# Patient Record
(demographics unavailable — no encounter records)

---

## 2021-02-26 NOTE — NUR
DC INSTRUCTIONS GIVEN TO PT. STATES UNDERSTANDING. FLUSHED PORT PER
PROTOCOL BEFORE REMOVING. DC'D IV CATH FULLY INTACT. WILL DC PT
SHORTLY

## 2021-02-26 NOTE — OP
PATIENT NAME:  DORIAN SONI                       MEDICAL RECORD: [id number]
:85                                             LOCATION:D.OPS          
                                                         ADMISSION DATE:        
SURGEON:  DAYNA AHMADI MD              
 
 
DATE OF OPERATION:  2021
 
PROCEDURE:  Upper endoscopy.
 
PREOPERATIVE DIAGNOSIS:  Anemia.
 
MEDICATIONS:  Propofol per anesthesia.
 
Upper endoscopy was performed.  The endoscope was advanced through the mouth and
advanced to the second part of the duodenum.  The entire examined esophagus was
normal.  In the gastric body was mild erythema consistent with gastritis. 
Random gastric biopsies were taken.  The entire examined duodenum was normal. 
Small bowel biopsies were taken.  The patient tolerated the procedure well. 
There were no immediate complications.
 
FINAL DIAGNOSIS:  Normal esophagus, mild gastritis, normal duodenum.
 
PLAN:  Check histology results, advance diet, await biopsies and decide on any
further medications.  Recommend colonoscopy for anemia workup.
 
TRANSINT:[id number] Voice Confirmation ID: [id number] DOCUMENT ID: [id number]
                                           
                                           DAYNA AHMADI MD              
 
 
 
 
 
 
 
 
 
 
 
 
 
 
 
 
 
 
 
 
 
CC:                                                             [id number]
DICTATION DATE: 21 1544     :     21 0108      [facility] 
                                                                      [id number] [place] [address]

## 2021-04-19 NOTE — NUR
ROUNDS MADE. PT LYING IN BED W/EYES CLOSED. RESP EVEN AND UNLABORED. CURRENT
BOLUS NEAR COMPLETE. IV RATE CHANGED TO 125ML/HR. ICE WATER SERVED. PT DENIES
FURTHER NEEDS. BED LOW, SIDE RAILS UP X 2. CALL LIGHT AT PT'S SIDE.

## 2021-04-19 NOTE — NUR
ROUNDS MADE. PT INFORMED THAT HER TEST RESULTS CAME BACK NEGATIVE. REPORT HAS
BEEN CALLED TO DR ANDERSEN. NO ADDITIONAL ORDERS AT THIS TIME. POC WILL
CONTINUE. WHILE AT BEDSIDE, PT REQUEST TO GET UP TO VOID. PT OOB W/MINIMAL
ASSISTANCE. AMBIATORY TO BR. VOIDS APPROX 200ML IN NUNS CAP. FRESH PERIPADS
PROVIDED. PT BACK TO BED. TYLENOL 650MG PO GIVEN FOR TEMP .5. PT NOW
REQUESTING PAIN MEDICATION. MORPHINE 4MG DILUTED IN 5ML NS GIVEN SIVP. SEE
EMAR. PT DENIES FURTHER NEEDS AT THIS TIME. BED LOW. SIDE RAILS UP X 2. CALL
LIGHT AT PT'S SIDE.

## 2021-04-19 NOTE — NUR
PT RETURNS TO  VIA W/C PER MEDICAL IMAGING STAFF. PT REQUEST ASSISTANCE
W/GETTING TO THE ALLYSSA KELLEY RN TO BEDSIDE.

## 2021-04-19 NOTE — NUR
FREDERICK RN AND DARLEEN ADAMS RN TO BEDSIDE FOR BEDSIDE SHIFT REPORT. REC'D PT AA&O X 4
LYING IN BED. PAIN ASSESSED. PT REPORTS PAIN 4/10 AND REQUEST TO GET UP TO
VOID AT THIS TIME. PT OOB W/MINIMAL ASSIST. PT REPORT FEELING INCREASED PAIN
UPON MOVING AND AMBULATING. PT AMBULATORY TO BR AND ABLE TO VOID APPROX 1500ML
URINE IN NUNS CAP. PT STATES SHE IS UNABLE TO CONTROL HER URINE FLOW. PT
AMBULATORY BACK TO BED. MINIMAL ASSISTANCE NEEDED. PT REPORTS PAIN IS GREATER
NOW. PAIN MEDICATION OFFERED. PT ACCEPTS.

## 2021-04-19 NOTE — NUR
REPORT OF NEGATIVE CHEST X-RAY AND LUNG SCAN CALLED TO DR ANDERSEN. REPORT OF
MOST RECENT TEMP .6 AND PLANS FOR RECHECK NOW. MD CONFIRMS ORDER TO
ADMIN TYLENOL FOR TEMP 100.4 OR GREATER.

## 2021-04-19 NOTE — NUR
FLAGYL 500MG IVPB INFUSING VIA ALARIS PUMP PER MD ORDERS. SEE EMAR
PATIENT SLEEPING WITH EVEN RESPIRATIONS, NO DISTRESS NOTED. WILL CONTINUE TO
MONITOR.

## 2021-04-19 NOTE — NUR
PAIN REASSESSMENT COMPLETED, PT NOW RATES PAIN 2 OUT OF10 ON NUMERIC PAIN
SCALE, RESTING WITH EYES CLOSED, RESP EVEN AND UNLABORED, WILL MONITOR.  C/L
IN EASY REACH.

## 2021-04-19 NOTE — NUR
ASSISTED PT UP TO BATHROOM. PT VOIDED 200CC CLEAR, YELLOW URINE. URINE
STRAINED, NO SEDIMENT OR STONES NOTED. PT AMBULATED BACK TO BED WITHOUT
DIFFICULTY. IV PLACED BACK ON IVP @ 100CC/HR.

## 2021-04-19 NOTE — NUR
PATIENT UP TO BATHROOM, VOIDED WITHOUT DIFFICULTY AND PT BACK TO BED.
PAIN MEDICATION ADMINISTERED PER PT REQUEST AND MD ORDERS. SEE EMAR.

## 2021-04-19 NOTE — NUR
ROUNDS MADE. PT AWAKE IN BED. REPORTS SHE IS SWEATY AND REQUEST A COLD CLOTH.
COLD WET CLOTH PROVIDED. PT DENIES FURTHER NEEDS.

## 2021-04-19 NOTE — NUR
PT C/O SHARP, COLICKY, AND RADIATING PAIN FROM "WHERE MY KIDNEYS ARE TO MY
GROIN" WHILE POINTING TO SUPRAPUBIC AREA.  PRN MORPHINE GIVEN PER MD ORDERS
AND AS REQUESTED PER MD.  ASSISTED OOB TO BR, VOIDS 100ML DARK YELLOW URINE TO
SPECIPAN.  ASSISTED BACK TO BED, POSITIONED TO COMFORT, HOB ELEVATED 30
DEGREES, SR UP X2, BED IN LOW POSITION, BED BRAKES LOCKED.  C/L IN EASY REACH.
 WILL MONITOR.

## 2021-04-19 NOTE — NUR
SHIFT ASSESSMENT COMPLETED, SEE FLOWSHEET. PATIENT UP TO BATHROOM, VOIDED
WITHOUT DIFFICULTY. BED SHEETS CHANGED DUE TO PT SWEATING AND PT BACK TO BED.
NO OTHER NEEDS IDENTIFIED, WILL CONTINUE TO MONITOR.

## 2021-04-19 NOTE — NUR
PT TO L&D UNIT FROM ER WITH C/O LOW ABDOMINAL AND BACK PAIN THAT RADIATES AND
CONSTANT, ALSO REPORTS FEVER AT HOME.  PT AAOX4, VSS, TEMP 99.4.  PULSE OX
95-97% ON RA.  CHANGED INTO PT GOWN, ORIENTED TO ROOM/UNIT/TV, REVIEWED PLAN
OF CARE THIS PM.  IVF INFUSING TO RIGHT UPPER ARM PIV SITE VIA IVAC -NS AT
100ML/HR AND LEVAQUIN PER PIGGY BACK AT PRESET RATE.  SITE BENIGN TO
INSPECTION.  SR UP X2, BED IN LOW POSITION, BED BRAKES LOCKED, HOB ELEVATED 30
DEGREES.  NPO.  WILL MONITOR.

## 2021-04-19 NOTE — NUR
PT RINGS CALL LIGHT. THIS RN TO BEDSIDE. PT SITTING ON SIDE OF BED REPORTING
SHE WANTS UP TO PEE, BUT NEEDS ASSISTANCE W/THE IV POLE. PT ASSISTED W/GETTING
TO THE BR. ABLE TO VOID 100ML. PT REPORTS LEAKING URINE ON HER PANTIES. PT
ASSISTED W/CLEANING PERIAREA AND PUTTING CLEAN PANTIES AND PADS ON. PT BACK TO
BED. PAIN ASSESSED. PT REPORTS PAIN IS 7-8/10 NOW. PT INFORMED OF NEXT TIME
SHE CAN HAVE PAIN MEDICATION. PT VERBALZIES UNDERSTANDING.

## 2021-04-20 NOTE — NUR
DR ANDERSEN ON UNIT, STATES SHE IS GOING TO PLACE ORDER FOR PT TO START ON
LOVENOX BID. STATES SHE WILL ROUND AND SEE HER THIS AFTERNOON.

## 2021-04-20 NOTE — NUR
RN TO BEDSIDE. PT UP AND AMB IN ROOM AFTER USING THE BATHROOM. PIV TO RT UPPER
ARM D/C'D WITH CATH TIP INTACT. PRESSURE DRESSING APPLIED TO SITE. PT
TOLERATED WELL. INSTRUCTED PT ON SELF ADMINISTERING LOVENOX. PT ADMINISTERED
TO LUQ PER SELF AND TOLERATED WELL. D/C INSTRUCTIONS EXPLAINED, SIGNED, AND
WITNESSED AT THIS TIME. PT UP TO DRESS FOR D/C TO HOME.

## 2021-04-20 NOTE — NUR
PT OFF UNIT VIA W/C TO AWAITING RIDE. NO S/S OF DISTRESS NOTED. INSTRUCTED PT
TO  PRESCRIPTION AT [facility] ON [facility]. UNDERSTANDING VERBALIZED.

## 2021-04-20 NOTE — NUR
DR ANDERSEN ON UNIT, REPORT GIVEN THAT IV HAS INFILTRATED. STATES WILL PLACE
ORDERS FOR PO ANTIBIOTICS.

## 2021-04-20 NOTE — NUR
THIS RN TO ROOM FOR SHIFT ASSESSMENT. PT SUPINE IN BED, HOB APPROX 60 DEGREES.
PAIN REASSESSMENT FROM PM SHIFT MORPHINE ADMIN DONE, PT RATES PAIN APPROX 3/10
IN LOWER ABD. SHIFT ASSESSMENT COMPLETED, VSS, SEE FLOWSHEET FOR DOC. PIV
INFUSING NS AS ORDERED TO UPPER RIGHT ARM. POC DISCUSSED. PT ENCOURAGED TO
COUGH AND DEEP BREATHE. INCENTIVE SPIROMETER GIVEN ALONG WITH TEACHING. PT
VERBALIZES UNDERSTANDING. EATING BREAKFAST, DENIES NAUSEA. STATES WILL GET UP
TO BR SHORTLY. SRUx2, CL IN REACH.

## 2021-04-20 NOTE — NUR
THIS RN TO ROOM FOR PT CHECK. PT SITTING UP IN BED WITH LIGHTS DIM. DENIES
PAIN OR ANY NEEDS AT THIS TIME. AWAITING DR ANDERSEN TO ROUND.

## 2021-04-20 NOTE — NUR
THIS RN TO ROOM FOR PT CHECK. PT SITTING UP IN BED, RESTING WITH LIGHTS DIM.
AAOx3. PT DENIES NEEDS, STATES SHE GOT UP TO BR TO VOID AND IS IN URINE HAT.
100ML CLEAR YELLOW URINE NOTED IN CONTAINER. FRESH ICE WATER GIVEN. PT
INSTRUCTED TO CALL FOR ANY NEEDS. SRUx2, CL IN REACH.

## 2021-04-20 NOTE — NUR
RN TO PT BEDSIDE. SHIFT ASSESSMENT COMPLETED AT THIS TIME. PT C/O PAIN 7/10 IN
ABD. MORPHINE 4 MG SIVP GIVEN PER ORDERS. SEE EMAR. HR-RRR, PPP, LUNGS CTAB,
BOWEL SOUNDS ACTIVE X4. PIV TO RIGHT UPPER ARM PATENT WITH NO SIGNS OF
ERYTHEMA OR EDEMA NOTED TO SITE. PT DENIES FURTHER NEEDS. BED LOW, WHEELS
LOCKED, CALL LIGHT AND PHONE WITHIN REACH, SIDE RAILS UP X2.

## 2021-04-20 NOTE — NUR
levofloxacin 750mg infusing via alaris pump per md orders, see emar. pt
continues to sleep with even respirations, no signs of distress noted. will
continue to monitor

## 2021-04-20 NOTE — NUR
PT UP TO BATHROOM, VOIDED WITHOUT DIFFICULTY. PADS PROVIDED FOR LEAKING URINE
PER PT REQUEST. PT BACK TO BED AND MORPHINE ADMINISTERED PER MD ORDERS AND PT
REQUEST FOR 10/10 PAIN. SEE EMAR.

## 2021-04-20 NOTE — NUR
THIS RN TO ROOM FOR LOVENOX ADMIN. LOVENOX MED INSTRUCTIONS GIVEN WITH
RATIONALE, PT AGREEABLE TO TAKING LOVENOX. ADMIN AS ORDERED, SEE EMAR FOR DOC.
PT C/O PAIN RETURNING, RATES PAIN 7-8/10 IN LOWER ABD, REQUESTING MORPHINE.
MORPHINE ADMIN AS ORDERED PRN, SEE EMAR FOR DOC. PT DENIES FURTHER NEEDS AT
THIS TIME. SRUx2, CL IN REACH. WILL CONT TO MONITOR.

## 2021-04-20 NOTE — NUR
DR ANDERSEN ON UNIT. PT TO D/C TO HOME AFTER 2215 DOSE OF LOVENOX. PT WILL D/C
TO HOME WITH PRESCRIPTION OF LOVENOX. RN TO PROVIDE EDUCATION TO PT ON DOSAGES
AND ADMINISTRATION.

## 2021-04-20 NOTE — NUR
PT RESTING IN BED WITH EYES CLOSED. LIGHTS IN ROOM DIM, PT LEFT UNDISTURBED
FOR REST. SRUx2, CL IN REACH.

## 2021-04-20 NOTE — NUR
PT PRESSES CALL LIGHT, THIS RN TO ROOM. PT STATES SHE THINKS IV SITE MAY BE
LEAKING AS PILLOW WAS DAMP UNDERNEATH IT. IV SITE ASSESSED, NO LEAK SEEN.
INFUSING PROPERLY, NO SIGNS OF INFILTRATION OR PHLEBITIS. TOWEL PLACED UNDER
PT ARM UNDER IV SITE. WILL CONT TO MONITOR. PT RATES PAIN 1/10 AFTER MORPHINE,
DENIES ANY NEEDS. SRUx2, CL IN REACH. LIGHTS IN ROOM DIM FOR REST.

## 2021-04-20 NOTE — NUR
SPOKE WITH DR. ANDERSEN. INFORMED MD CASE MANAGEMENT CALLED AND STATED PT DID
NOT MEET CRITERIA FOR CONTINUED OBSERVATION STATUS. MD STATES WILL COME ROUND
ON PT.